# Patient Record
Sex: MALE | Race: BLACK OR AFRICAN AMERICAN | Employment: OTHER | URBAN - NONMETROPOLITAN AREA
[De-identification: names, ages, dates, MRNs, and addresses within clinical notes are randomized per-mention and may not be internally consistent; named-entity substitution may affect disease eponyms.]

---

## 2023-06-13 ENCOUNTER — HOSPITAL ENCOUNTER (INPATIENT)
Age: 73
LOS: 4 days | Discharge: PSYCHIATRIC OR SUBSTANCE USE FACILITY | DRG: 812 | End: 2023-06-17
Attending: EMERGENCY MEDICINE | Admitting: HOSPITALIST
Payer: MEDICARE

## 2023-06-13 DIAGNOSIS — E61.1 IRON DEFICIENCY: ICD-10-CM

## 2023-06-13 DIAGNOSIS — D64.9 ANEMIA REQUIRING TRANSFUSIONS: Primary | ICD-10-CM

## 2023-06-13 LAB
ABO: NORMAL
ALBUMIN SERPL BCG-MCNC: 4 G/DL (ref 3.5–5.1)
ALP SERPL-CCNC: 63 U/L (ref 38–126)
ALT SERPL W/O P-5'-P-CCNC: 14 U/L (ref 11–66)
ANION GAP SERPL CALC-SCNC: 13 MEQ/L (ref 8–16)
ANTIBODY SCREEN: NORMAL
APTT PPP: 28.3 SECONDS (ref 22–38)
AST SERPL-CCNC: 16 U/L (ref 5–40)
BACTERIA URNS QL MICRO: ABNORMAL /HPF
BILIRUB CONJ SERPL-MCNC: < 0.2 MG/DL (ref 0–0.3)
BILIRUB SERPL-MCNC: 0.3 MG/DL (ref 0.3–1.2)
BILIRUB UR QL STRIP.AUTO: NEGATIVE
BUN SERPL-MCNC: 22 MG/DL (ref 7–22)
CALCIUM SERPL-MCNC: 8.2 MG/DL (ref 8.5–10.5)
CASTS #/AREA URNS LPF: ABNORMAL /LPF
CASTS 2: ABNORMAL /LPF
CHARACTER UR: CLEAR
CHLORIDE SERPL-SCNC: 106 MEQ/L (ref 98–111)
CO2 SERPL-SCNC: 20 MEQ/L (ref 23–33)
COLOR: YELLOW
CREAT SERPL-MCNC: 1.5 MG/DL (ref 0.4–1.2)
CRYSTALS URNS MICRO: ABNORMAL
EPITHELIAL CELLS, UA: ABNORMAL /HPF
FERRITIN SERPL IA-MCNC: 5 NG/ML (ref 22–322)
GFR SERPL CREATININE-BSD FRML MDRD: 49 ML/MIN/1.73M2
GLUCOSE SERPL-MCNC: 100 MG/DL (ref 70–108)
GLUCOSE UR QL STRIP.AUTO: NEGATIVE MG/DL
HEMOCCULT STL QL: NEGATIVE
HGB UR QL STRIP.AUTO: NEGATIVE
INR PPP: 1.02 (ref 0.85–1.13)
IRON SATN MFR SERPL: 4 % (ref 20–50)
IRON SERPL-MCNC: 15 UG/DL (ref 65–195)
KETONES UR QL STRIP.AUTO: NEGATIVE
LDH SERPL L TO P-CCNC: 197 U/L (ref 100–190)
MISCELLANEOUS 2: ABNORMAL
NITRITE UR QL STRIP: NEGATIVE
OSMOLALITY SERPL CALC.SUM OF ELEC: 281 MOSMOL/KG (ref 275–300)
PH UR STRIP.AUTO: 6.5 [PH] (ref 5–9)
POTASSIUM SERPL-SCNC: 4.8 MEQ/L (ref 3.5–5.2)
PROT SERPL-MCNC: 6.2 G/DL (ref 6.1–8)
PROT UR STRIP.AUTO-MCNC: ABNORMAL MG/DL
RBC URINE: ABNORMAL /HPF
RENAL EPI CELLS #/AREA URNS HPF: ABNORMAL /[HPF]
RH FACTOR: NORMAL
SCAN OF BLOOD SMEAR: NORMAL
SODIUM SERPL-SCNC: 139 MEQ/L (ref 135–145)
SP GR UR REFRACT.AUTO: 1.01 (ref 1–1.03)
TIBC SERPL-MCNC: 403 UG/DL (ref 171–450)
UROBILINOGEN, URINE: 0.2 EU/DL (ref 0–1)
WBC #/AREA URNS HPF: ABNORMAL /HPF
WBC #/AREA URNS HPF: ABNORMAL /[HPF]
YEAST LIKE FUNGI URNS QL MICRO: ABNORMAL

## 2023-06-13 PROCEDURE — 30233N1 TRANSFUSION OF NONAUTOLOGOUS RED BLOOD CELLS INTO PERIPHERAL VEIN, PERCUTANEOUS APPROACH: ICD-10-PCS | Performed by: PHYSICIAN ASSISTANT

## 2023-06-13 PROCEDURE — 82607 VITAMIN B-12: CPT

## 2023-06-13 PROCEDURE — 81001 URINALYSIS AUTO W/SCOPE: CPT

## 2023-06-13 PROCEDURE — 99223 1ST HOSP IP/OBS HIGH 75: CPT

## 2023-06-13 PROCEDURE — 82248 BILIRUBIN DIRECT: CPT

## 2023-06-13 PROCEDURE — 93010 ELECTROCARDIOGRAM REPORT: CPT | Performed by: NUCLEAR MEDICINE

## 2023-06-13 PROCEDURE — 82728 ASSAY OF FERRITIN: CPT

## 2023-06-13 PROCEDURE — 83540 ASSAY OF IRON: CPT

## 2023-06-13 PROCEDURE — 85610 PROTHROMBIN TIME: CPT

## 2023-06-13 PROCEDURE — 85025 COMPLETE CBC W/AUTO DIFF WBC: CPT

## 2023-06-13 PROCEDURE — 93005 ELECTROCARDIOGRAM TRACING: CPT | Performed by: EMERGENCY MEDICINE

## 2023-06-13 PROCEDURE — 86923 COMPATIBILITY TEST ELECTRIC: CPT

## 2023-06-13 PROCEDURE — 2580000003 HC RX 258

## 2023-06-13 PROCEDURE — 86900 BLOOD TYPING SEROLOGIC ABO: CPT

## 2023-06-13 PROCEDURE — 83615 LACTATE (LD) (LDH) ENZYME: CPT

## 2023-06-13 PROCEDURE — 86850 RBC ANTIBODY SCREEN: CPT

## 2023-06-13 PROCEDURE — 82746 ASSAY OF FOLIC ACID SERUM: CPT

## 2023-06-13 PROCEDURE — 82272 OCCULT BLD FECES 1-3 TESTS: CPT

## 2023-06-13 PROCEDURE — P9016 RBC LEUKOCYTES REDUCED: HCPCS

## 2023-06-13 PROCEDURE — 86901 BLOOD TYPING SEROLOGIC RH(D): CPT

## 2023-06-13 PROCEDURE — 99285 EMERGENCY DEPT VISIT HI MDM: CPT

## 2023-06-13 PROCEDURE — 36415 COLL VENOUS BLD VENIPUNCTURE: CPT

## 2023-06-13 PROCEDURE — 80053 COMPREHEN METABOLIC PANEL: CPT

## 2023-06-13 PROCEDURE — 1200000003 HC TELEMETRY R&B

## 2023-06-13 PROCEDURE — 85730 THROMBOPLASTIN TIME PARTIAL: CPT

## 2023-06-13 PROCEDURE — 83550 IRON BINDING TEST: CPT

## 2023-06-13 RX ORDER — ACETAMINOPHEN 325 MG/1
650 TABLET ORAL EVERY 6 HOURS PRN
Status: DISCONTINUED | OUTPATIENT
Start: 2023-06-13 | End: 2023-06-17 | Stop reason: HOSPADM

## 2023-06-13 RX ORDER — SODIUM CHLORIDE 9 MG/ML
INJECTION, SOLUTION INTRAVENOUS PRN
Status: DISCONTINUED | OUTPATIENT
Start: 2023-06-13 | End: 2023-06-17 | Stop reason: HOSPADM

## 2023-06-13 RX ORDER — POTASSIUM CHLORIDE 7.45 MG/ML
10 INJECTION INTRAVENOUS PRN
Status: DISCONTINUED | OUTPATIENT
Start: 2023-06-13 | End: 2023-06-17 | Stop reason: HOSPADM

## 2023-06-13 RX ORDER — ONDANSETRON 2 MG/ML
4 INJECTION INTRAMUSCULAR; INTRAVENOUS EVERY 6 HOURS PRN
Status: DISCONTINUED | OUTPATIENT
Start: 2023-06-13 | End: 2023-06-17 | Stop reason: HOSPADM

## 2023-06-13 RX ORDER — POTASSIUM CHLORIDE 20 MEQ/1
40 TABLET, EXTENDED RELEASE ORAL PRN
Status: DISCONTINUED | OUTPATIENT
Start: 2023-06-13 | End: 2023-06-17 | Stop reason: HOSPADM

## 2023-06-13 RX ORDER — SUCRALFATE 1 G/1
1 TABLET ORAL 3 TIMES DAILY
COMMUNITY

## 2023-06-13 RX ORDER — POLYETHYLENE GLYCOL 3350 17 G/17G
17 POWDER, FOR SOLUTION ORAL DAILY PRN
Status: DISCONTINUED | OUTPATIENT
Start: 2023-06-13 | End: 2023-06-17 | Stop reason: HOSPADM

## 2023-06-13 RX ORDER — PANTOPRAZOLE SODIUM 40 MG/1
40 TABLET, DELAYED RELEASE ORAL DAILY
COMMUNITY

## 2023-06-13 RX ORDER — M-VIT,TX,IRON,MINS/CALC/FOLIC 27MG-0.4MG
1 TABLET ORAL DAILY
COMMUNITY

## 2023-06-13 RX ORDER — ACETAMINOPHEN 650 MG/1
650 SUPPOSITORY RECTAL EVERY 6 HOURS PRN
Status: DISCONTINUED | OUTPATIENT
Start: 2023-06-13 | End: 2023-06-17 | Stop reason: HOSPADM

## 2023-06-13 RX ORDER — ONDANSETRON 4 MG/1
4 TABLET, ORALLY DISINTEGRATING ORAL EVERY 8 HOURS PRN
Status: DISCONTINUED | OUTPATIENT
Start: 2023-06-13 | End: 2023-06-17 | Stop reason: HOSPADM

## 2023-06-13 RX ORDER — SODIUM CHLORIDE 0.9 % (FLUSH) 0.9 %
5-40 SYRINGE (ML) INJECTION PRN
Status: DISCONTINUED | OUTPATIENT
Start: 2023-06-13 | End: 2023-06-17 | Stop reason: HOSPADM

## 2023-06-13 RX ORDER — DOCUSATE SODIUM 100 MG/1
100 CAPSULE, LIQUID FILLED ORAL 2 TIMES DAILY
COMMUNITY

## 2023-06-13 RX ORDER — MAGNESIUM SULFATE IN WATER 40 MG/ML
2000 INJECTION, SOLUTION INTRAVENOUS PRN
Status: DISCONTINUED | OUTPATIENT
Start: 2023-06-13 | End: 2023-06-17 | Stop reason: HOSPADM

## 2023-06-13 RX ORDER — LANOLIN ALCOHOL/MO/W.PET/CERES
5000 CREAM (GRAM) TOPICAL DAILY
COMMUNITY

## 2023-06-13 RX ORDER — SODIUM CHLORIDE 0.9 % (FLUSH) 0.9 %
5-40 SYRINGE (ML) INJECTION EVERY 12 HOURS SCHEDULED
Status: DISCONTINUED | OUTPATIENT
Start: 2023-06-13 | End: 2023-06-17 | Stop reason: HOSPADM

## 2023-06-13 RX ADMIN — SODIUM CHLORIDE, PRESERVATIVE FREE 10 ML: 5 INJECTION INTRAVENOUS at 21:33

## 2023-06-13 ASSESSMENT — PAIN - FUNCTIONAL ASSESSMENT
PAIN_FUNCTIONAL_ASSESSMENT: NONE - DENIES PAIN
PAIN_FUNCTIONAL_ASSESSMENT: 0-10

## 2023-06-13 ASSESSMENT — PAIN SCALES - GENERAL: PAINLEVEL_OUTOF10: 0

## 2023-06-13 NOTE — ED PROVIDER NOTES
[facility] [address] [address] EMERGENCY DEPT    EMERGENCY MEDICINE     Pt Name: Claire Mayers  MRN: [id number]  [place] 1950  Date of evaluation: 6/13/2023  Resident Physician: Lindsay Moore MD  Supervising Physician: Marianela Shin MD    CHIEF COMPLAINT       Chief Complaint   Patient presents with    Other     Pt states his doctor told him to come in but he doesnt know why      HISTORY OF PRESENT ILLNESS   Claire Mayers is a pleasant 67 y.o. male who presents to the emergency department from [address] [place] [address] for an abnormal lab. Patient is unsure why he is here. States that he was taking his wife to doctor's appointment and now he has ended up being the patient. Per review of the medical records, there is a hemoglobin level of 4.9. Patient does state that they told him this. He does state that over the past 2 months, he has noticed increased fatigue with exertion. He used to be able to walk \"blocks box and blocks\" but now he is only able to walk half a block before becoming short of breath. Denies lightheadedness, dizziness, or syncope. He denies fevers, abdominal pain, blood in his stool, blood in his urine, blood thinners. PASTMEDICAL HISTORY   No past medical history on file. There is no problem list on file for this patient. SURGICAL HISTORY     No past surgical history on file. CURRENT MEDICATIONS       Previous Medications    No medications on file       ALLERGIES     has No Known Allergies. FAMILY HISTORY     has no family status information on file. SOCIAL HISTORY          PHYSICAL EXAM       ED Triage Vitals [06/13/23 1543]   BP Temp Temp Source Pulse Respirations SpO2 Height Weight - Scale   125/86 98.2 °F (36.8 °C) Oral 67 18 100 % -- 130 lb (59 kg)       Physical Exam  Vitals and nursing note reviewed. Exam conducted with a chaperone present. Constitutional:       General: He is not in acute distress. Appearance: He is normal weight.    HENT:      Head:

## 2023-06-13 NOTE — ED NOTES
Pt alert and oriented x3. Pt resting on cot. Pt stated Shannan Hart is going to sleep good tonight. \"     Daxa Boone, CARLOS A  06/13/23 5142

## 2023-06-13 NOTE — CONSENT
Informed Consent for Blood Component Transfusion Note    I have discussed with the patient the rationale for blood component transfusion; its benefits in treating or preventing fatigue, organ damage, or death; and its risk which includes mild transfusion reactions, rare risk of blood borne infection, or more serious but rare reactions. I have discussed the alternatives to transfusion, including the risk and consequences of not receiving transfusion. The patient had an opportunity to ask questions and had agreed to proceed with transfusion of blood components.     Electronically signed by Soco Velásquez MD on 6/13/23 at 6:19 PM EDT

## 2023-06-13 NOTE — ED NOTES
Pt tolerating blood well. Pt resting on cot with no concerns at this time. Pt 15 min observation completed.      Sravani Rivera RN  06/13/23 4501

## 2023-06-13 NOTE — ED PROVIDER NOTES
[facility]  EMERGENCY MEDICINE ATTENDING ATTESTATION      Evaluation of Avni Lopez. Case discussed and care plan developed with resident physician. I agree with the nurse practitioner documentation and plan as documented by him, except if my documentation differs. Patient seen, interviewed and examined by me. I reviewed the medical, surgical, family and social history, medications and allergies. I have reviewed the nursing documentation. Brief H&P   Patient transferred from nursing home for hemoglobin of 4. Patient is currently fairly asymptomatic per his report, only complaining of feeling tired for some time. Patient is slightly disoriented but he is oriented in person, situation and place. Denies melena, hematemesis, hematuria, leg edema. Physical exam is notable for well appearing, slightly pale, otherwise nonfocal exam.      Medical Decision Making   MDM:   New symptomatic anemia with unclear cause  Plan:   IV line, labs  PRBC transfusion  Observation in the ED while awaiting results    Please see the resident physician completed note for final disposition except as documented on this attestation. I have reviewed and interpreted all available lab, radiology and ekg results available at the moment. Diagnosis, treatment and disposition plans were discussed and agreed upon by patient. This transcription was electronically signed. It was dictated by use of voice recognition software and electronically transcribed. The transcription may contain errors not detected in proofreading.      I performed direct supervision and was present for the critical portion following procedures: None  Critical care time on this case: None    Electronically signed by Melinda Cerna MD on 6/13/23 at 5:08 PM EDT        Melinda Cerna MD  06/13/23 0691

## 2023-06-13 NOTE — ED NOTES
ED to inpatient nurses report    Chief Complaint   Patient presents with    Other     Pt states his doctor told him to come in but he doesnt know why       Present to ED from detox  LOC: alert and orientated to name, place, date  Vital signs   Vitals:    06/13/23 1655 06/13/23 1746 06/13/23 1802 06/13/23 1807   BP: 126/88 (!) 160/95 132/70 131/71   Pulse: 76 82 72 75   Resp: 17 15 16 15   Temp:  98.7 °F (37.1 °C) 98.6 °F (37 °C) 98.7 °F (37.1 °C)   TempSrc:  Oral Oral Oral   SpO2: 99% 100% 100% 100%   Weight:          Oxygen Baseline room air    Current needs required room air Bipap/Cpap No  LDAs:   Peripheral IV 68/37/99 Right Cephalic (Active)   Site Assessment Clean, dry & intact 06/13/23 1555   Phlebitis Assessment No symptoms 06/13/23 1555   Infiltration Assessment 0 06/13/23 1555     Mobility: Requires assistance * 1  Pending ED orders: none  Present condition: stable    C-SSRS Risk of Suicide: No Risk  Swallow Screening    Preferred Language: [place]     Electronically signed by Anjana Mehta RN on 6/13/2023 at 6:09 PM       Anjana Mehta RN  06/13/23 1720

## 2023-06-13 NOTE — ED NOTES
Pt resting quietly on cot in stable condition. Denies any needs at this time. Call light in reach.       Girish De Leon RN  06/13/23 0133

## 2023-06-13 NOTE — ED NOTES
Pt to the ED via personal  transport. Pt states he is unsure why he has been sent to this facility. Pt states he was at a doctors office about 30 minutes away and they sent him here. Pt also states he was a pt in another hospital and that hospital transferred him here and he will need transferred back. Pt also states he had taken his wife to the doctor and in turn was sent here. Pt appears to be a poor historian. Pt arrived with envelope with documents from [address] [place] [address] with written document stating his HGB is 4. EKG completed and IV access obtained. Telemetry applied. Patient is alert with Respirations are regular and unlabored. Patient provided blanket. Call light within reach.       Rebel Arriaza, RN  06/13/23 7431

## 2023-06-13 NOTE — H&P
Hospitalist History & Physical    Patient:  Girma Wan    Unit/Bed:10/010A  YOB: 1950  MRN: [id number]   Acct: [de-identified]   PCP: PROVIDER UNKNOWN, LILIANA  Code Status: No Order    Date of Service: Pt seen/examined on 06/13/23 and admitted to Inpatient with expected LOS greater than two midnights due to medical therapy. Chief Complaint: anemia    Assessment/Plan:    Anemia, chronicity unknown: Hgb 4.6 on admission. Found incidentally on labs per ER. Found to be at least partially due to [address]. Fecal occult negative. Relatively asymptomatic. 1 unit pRBCs to be transfused per ER, another unit ordered. Given #5, vitamin b12 and folate pending. H&H q8 Hrs. Iron Deficiency Anemia: low ferritin, iron, and iron saturation. Consider Venofer vs. Iron supplementation s/p transfusion. MITZY vs. CKD: Cr 1.5. Unknown baseline. Urinalysis pending. Avoid nephrotoxic agents as possible. Monitor with daily labs. Baseline Mentation: unknown- Alert & oriented to self and time, not oriented to situation or place. From [facility]. Gastric CA: s/p gastric resection. Noted per chart review. History of Present Illness:  Girma Wna is a 67 y.o. male with unknown PMHx who presented to [facility] with chief complaint of anemia. Patient is alone from [facility]. He is a very poor historian and not able to tell me why he is here or where was he is at. Patient alert to self and time. States his wife brought him here because he keeps smoking marijuana. He said he was supposed to stop but didn't. Patient denies any pain or concerns. Denies chest pain, shortness of breath, lightheadedness, dizziness, abdominal pain, change in stool, dark tarry stools, BRBPR, and nausea/vomiting. Review of Systems: Pertinent positives as noted in the HPI. All other systems reviewed and negative. Past Medical History:    No past medical history on file.     Past Surgical History:

## 2023-06-14 LAB
ANION GAP SERPL CALC-SCNC: 12 MEQ/L (ref 8–16)
ANISOCYTOSIS BLD QL SMEAR: PRESENT
ANISOCYTOSIS BLD QL SMEAR: PRESENT
AUTO DIFF PNL BLD: ABNORMAL
BASOPHILS ABSOLUTE: 0 THOU/MM3 (ref 0–0.1)
BASOPHILS ABSOLUTE: 0 THOU/MM3 (ref 0–0.1)
BASOPHILS NFR BLD AUTO: 0.8 %
BASOPHILS NFR BLD AUTO: 0.9 %
BUN SERPL-MCNC: 20 MG/DL (ref 7–22)
CALCIUM SERPL-MCNC: 7.7 MG/DL (ref 8.5–10.5)
CHLORIDE SERPL-SCNC: 109 MEQ/L (ref 98–111)
CO2 SERPL-SCNC: 19 MEQ/L (ref 23–33)
CREAT SERPL-MCNC: 1.3 MG/DL (ref 0.4–1.2)
DEPRECATED RDW RBC AUTO: 50.2 FL (ref 35–45)
DEPRECATED RDW RBC AUTO: 54.4 FL (ref 35–45)
EOSINOPHIL NFR BLD AUTO: 1.6 %
EOSINOPHIL NFR BLD AUTO: 2.6 %
EOSINOPHILS ABSOLUTE: 0.1 THOU/MM3 (ref 0–0.4)
EOSINOPHILS ABSOLUTE: 0.1 THOU/MM3 (ref 0–0.4)
ERYTHROCYTE [DISTWIDTH] IN BLOOD BY AUTOMATED COUNT: 21.7 % (ref 11.5–14.5)
ERYTHROCYTE [DISTWIDTH] IN BLOOD BY AUTOMATED COUNT: 22.5 % (ref 11.5–14.5)
FOLATE SERPL-MCNC: 12.9 NG/ML (ref 4.8–24.2)
GFR SERPL CREATININE-BSD FRML MDRD: 58 ML/MIN/1.73M2
GLUCOSE SERPL-MCNC: 79 MG/DL (ref 70–108)
HCT VFR BLD AUTO: 19 % (ref 42–52)
HCT VFR BLD AUTO: 20.9 % (ref 42–52)
HCT VFR BLD AUTO: 26.5 % (ref 42–52)
HGB BLD-MCNC: 4.6 GM/DL (ref 14–18)
HGB BLD-MCNC: 5.4 GM/DL (ref 14–18)
HGB BLD-MCNC: 6.9 GM/DL (ref 14–18)
HGB BLD-MCNC: 7.5 GM/DL (ref 14–18)
HGB RETIC QN AUTO: 15.7 PG (ref 28.2–35.7)
HYPOCHROMIA BLD QL SMEAR: PRESENT
HYPOCHROMIA BLD QL SMEAR: PRESENT
IMM GRANULOCYTES # BLD AUTO: 0 THOU/MM3 (ref 0–0.07)
IMM GRANULOCYTES # BLD AUTO: 0.01 THOU/MM3 (ref 0–0.07)
IMM GRANULOCYTES NFR BLD AUTO: 0 %
IMM GRANULOCYTES NFR BLD AUTO: 0.2 %
IMM RETICS NFR: 26.2 % (ref 2.3–13.4)
LYMPHOCYTES ABSOLUTE: 1.1 THOU/MM3 (ref 1–4.8)
LYMPHOCYTES ABSOLUTE: 1.3 THOU/MM3 (ref 1–4.8)
LYMPHOCYTES NFR BLD AUTO: 27.8 %
LYMPHOCYTES NFR BLD AUTO: 28.5 %
MCH RBC QN AUTO: 16 PG (ref 26–33)
MCH RBC QN AUTO: 17.7 PG (ref 26–33)
MCHC RBC AUTO-ENTMCNC: 24.2 GM/DL (ref 32.2–35.5)
MCHC RBC AUTO-ENTMCNC: 25.8 GM/DL (ref 32.2–35.5)
MCV RBC AUTO: 66.2 FL (ref 80–94)
MCV RBC AUTO: 68.5 FL (ref 80–94)
MICROCYTES BLD QL SMEAR: PRESENT
MICROCYTES BLD QL SMEAR: PRESENT
MONOCYTES ABSOLUTE: 0.4 THOU/MM3 (ref 0.4–1.3)
MONOCYTES ABSOLUTE: 0.5 THOU/MM3 (ref 0.4–1.3)
MONOCYTES NFR BLD AUTO: 11.4 %
MONOCYTES NFR BLD AUTO: 12.2 %
NEUTROPHILS NFR BLD AUTO: 56.6 %
NEUTROPHILS NFR BLD AUTO: 57.4 %
NRBC BLD AUTO-RTO: 0 /100 WBC
NRBC BLD AUTO-RTO: 0 /100 WBC
PATHOLOGIST REVIEW: ABNORMAL
PLATELET # BLD AUTO: 179 THOU/MM3 (ref 130–400)
PLATELET # BLD AUTO: 232 THOU/MM3 (ref 130–400)
PMV BLD AUTO: ABNORMAL FL (ref 9.4–12.4)
PMV BLD AUTO: ABNORMAL FL (ref 9.4–12.4)
POTASSIUM SERPL-SCNC: 4.7 MEQ/L (ref 3.5–5.2)
RBC # BLD AUTO: 2.87 MILL/MM3 (ref 4.7–6.1)
RBC # BLD AUTO: 3.05 MILL/MM3 (ref 4.7–6.1)
RETICS # AUTO: 14 THOU/MM3 (ref 20–115)
RETICS/RBC NFR AUTO: 0.4 % (ref 0.5–2)
REVIEWED BY: NORMAL
SEGMENTED NEUTROPHILS ABSOLUTE COUNT: 2.2 THOU/MM3 (ref 1.8–7.7)
SEGMENTED NEUTROPHILS ABSOLUTE COUNT: 2.5 THOU/MM3 (ref 1.8–7.7)
SMEAR REVIEW: NORMAL
SODIUM SERPL-SCNC: 140 MEQ/L (ref 135–145)
VIT B12 SERPL-MCNC: > 2000 PG/ML (ref 211–911)
WBC # BLD AUTO: 3.8 THOU/MM3 (ref 4.8–10.8)
WBC # BLD AUTO: 4.4 THOU/MM3 (ref 4.8–10.8)

## 2023-06-14 PROCEDURE — 80048 BASIC METABOLIC PNL TOTAL CA: CPT

## 2023-06-14 PROCEDURE — 83010 ASSAY OF HAPTOGLOBIN QUANT: CPT

## 2023-06-14 PROCEDURE — 97110 THERAPEUTIC EXERCISES: CPT

## 2023-06-14 PROCEDURE — 36415 COLL VENOUS BLD VENIPUNCTURE: CPT

## 2023-06-14 PROCEDURE — 97165 OT EVAL LOW COMPLEX 30 MIN: CPT

## 2023-06-14 PROCEDURE — 85046 RETICYTE/HGB CONCENTRATE: CPT

## 2023-06-14 PROCEDURE — C9113 INJ PANTOPRAZOLE SODIUM, VIA: HCPCS | Performed by: NURSE PRACTITIONER

## 2023-06-14 PROCEDURE — 2580000003 HC RX 258: Performed by: NURSE PRACTITIONER

## 2023-06-14 PROCEDURE — 1200000003 HC TELEMETRY R&B

## 2023-06-14 PROCEDURE — 36430 TRANSFUSION BLD/BLD COMPNT: CPT

## 2023-06-14 PROCEDURE — 2580000003 HC RX 258

## 2023-06-14 PROCEDURE — 6360000002 HC RX W HCPCS: Performed by: NURSE PRACTITIONER

## 2023-06-14 PROCEDURE — 85025 COMPLETE CBC W/AUTO DIFF WBC: CPT

## 2023-06-14 PROCEDURE — 85014 HEMATOCRIT: CPT

## 2023-06-14 PROCEDURE — 99233 SBSQ HOSP IP/OBS HIGH 50: CPT

## 2023-06-14 PROCEDURE — 85018 HEMOGLOBIN: CPT

## 2023-06-14 RX ORDER — PANTOPRAZOLE SODIUM 40 MG/1
40 TABLET, DELAYED RELEASE ORAL DAILY
Status: DISCONTINUED | OUTPATIENT
Start: 2023-06-14 | End: 2023-06-14

## 2023-06-14 RX ORDER — SODIUM CHLORIDE 9 MG/ML
INJECTION, SOLUTION INTRAVENOUS PRN
Status: DISCONTINUED | OUTPATIENT
Start: 2023-06-14 | End: 2023-06-17 | Stop reason: HOSPADM

## 2023-06-14 RX ORDER — SODIUM CHLORIDE, SODIUM LACTATE, POTASSIUM CHLORIDE, CALCIUM CHLORIDE 600; 310; 30; 20 MG/100ML; MG/100ML; MG/100ML; MG/100ML
INJECTION, SOLUTION INTRAVENOUS CONTINUOUS
Status: DISCONTINUED | OUTPATIENT
Start: 2023-06-14 | End: 2023-06-16

## 2023-06-14 RX ORDER — SUCRALFATE 1 G/1
1 TABLET ORAL 3 TIMES DAILY
Status: DISCONTINUED | OUTPATIENT
Start: 2023-06-14 | End: 2023-06-17 | Stop reason: HOSPADM

## 2023-06-14 RX ORDER — SODIUM CHLORIDE 9 MG/ML
INJECTION, SOLUTION INTRAVENOUS PRN
Status: DISCONTINUED | OUTPATIENT
Start: 2023-06-14 | End: 2023-06-14

## 2023-06-14 RX ADMIN — IRON SUCROSE 200 MG: 20 INJECTION, SOLUTION INTRAVENOUS at 12:54

## 2023-06-14 RX ADMIN — PANTOPRAZOLE SODIUM 8 MG/HR: 40 INJECTION, POWDER, FOR SOLUTION INTRAVENOUS at 22:48

## 2023-06-14 RX ADMIN — SODIUM CHLORIDE, POTASSIUM CHLORIDE, SODIUM LACTATE AND CALCIUM CHLORIDE: 600; 310; 30; 20 INJECTION, SOLUTION INTRAVENOUS at 19:46

## 2023-06-14 RX ADMIN — PANTOPRAZOLE SODIUM 8 MG/HR: 40 INJECTION, POWDER, FOR SOLUTION INTRAVENOUS at 12:42

## 2023-06-14 NOTE — PROCEDURES
[facility] Endoscopy     EGD Report    Patient: Litzy Rivera  : 1950  Acct#: [de-identified]     BRIEF HISTORY AND INDICATIONS:    The patient is a 67 y.o.,  male with significant past medical history of profound anemia, history of gastric cancer s/p surgical resection. He is a xfer from [facility]. PREMEDICATION:  TIVA anesthesia was used, see Anesthesia note for details. INSTRUMENT:  Olympus GIF H-180 gastroscope    The risks and benefits of upper endoscopy with biopsy and dilation were  described to the patient, including but not limited to bleeding, infection, poking a hole someplace requiring surgery to fix it, having reaction to medication, and death. The patient understood these risks and provided informed consent. The patient was placed in the left lateral decubitus position. Conscious sedation was administered . The patient was continuously monitored to ensure adequate sedation and patient safety. A forward-viewing Olympus endoscope was lubricated and inserted through the mouth into the oropharynx. Under direct visualization, the upper esophagus was intubated. The scope was advanced to the esophagus and stomach to second portion of duodenum. Scope was slowly withdrawn with good views of mucosal surfaces. The scope was retroflexed in the fundus. Findings and maneuvers are listed in impression below. The patient tolerated the procedure well. The scope was removed. The patient was removed to the recovery area. IMPRESSION:      1. Thickened gastric folds concerning for recurring growth in the stomach, there may be submucosal invasion. Contact bleeding , friability . 2.   Biopsies taken of the abnormal appearing gastric mucosa . 3.   Prior surgery of the stomach. 4.   Erosive esophagitis. 5.   Otherwise, normal endoscopy to the 2nd portion of the duodenum. RECOMMENDATIONS:    1. Heme/Onco already seeing patient.

## 2023-06-14 NOTE — CARE COORDINATION
6/14/23, 7:50 AM EDT      DISCHARGE PLANNING EVALUATION    Stevan Arce  Admitted: 6/13/2023  Hospital Day: 1    Location: [facility]22/022-A Reason for admit: Iron deficiency [E61.1]  Anemia requiring transfusions [D64.9]  Acute on chronic anemia [D64.9]    Past Medical History:   Diagnosis Date    Hypertension        Procedure: N/A  Barriers to Discharge: Patient presents from [facility] for an abnormal lab. H/H on arrival 4.6/19.0. Patient was transfused with one unit PRBC's. H/H this a.m. 5.4/20.9, Creatinine 1.3. Prn Tylenol and Zofran, Magnesium and Potassium replacement protocol, transfuse second unit of PRBC's, Repeat H/H, telemetry, PT/OT, SS, up with assistance. PCP: PROVIDER UNKNOWN, AGPCNP    Readmission Risk Low 0-14, Mod 15-19), High > 20: Readmission Risk Score: 13.4      Advance Directives:      Code Status: Full Code   Patient's Primary Decision Maker is:        Patient Goals/Plan/Treatment Preferences: Benjamin Guerra is from [facility]. Transportation/Food Security/Housekeeping Addressed: No issues identified.

## 2023-06-14 NOTE — CARE COORDINATION
DISCHARGE PLANNING EVALUATION  6/14/23, 1:35 PM EDT    Reason for Referral: patient is from [facility]  Mental Status: alert and answers questions appropriately  Decision Making: self  Family/Social/Home Environment: Nick Singer lives at home with his wife, he reports he has 4 children that lives in the area. Pt reports being independent in his care prior  Current Services including food security, transportation and housekeeping: was at [facility] prior, does not plant to return  Current Equipment:none  Payment Source:Medicare  Concerns or Barriers to Discharge: none      Teach Back Method used with Nick Singer regarding care plan and needs  Patient and wife verbalized understanding of the plan of care and contribute to goal setting. Patient goals, treatment preferences and discharge plan: Return home with wife. Pt okay with contacting wife to confirm plans. Call to pt's wife, she confirms plans for pt to return back home, reports [facility] to be transporting pt back home. Call to [facility], spoke with Montrose Colette ext [phone], reports they can transport back home at discharge. She is asked if possible to give a days notice, reports drivers are already back home as the days got one, but if they know pt's possible discharge the day before they can be prepared for this. Electronically signed by BRITTON Bowser on 6/14/2023 at 1:35 PM       Readmission Risk Low 0-14, Mod 15-19), High > 20: Readmission Risk Score: 13    Current PCP: PROVIDER UNKNOWN, AGPCNP  PCP verified by CM?  No    Patient Orientation: Other (see comment) (alert and answers questions appropriately)    Patient Cognition: Alert  History Provided by: Significant Other, Other (see comment)    Advance Directives:      Code Status: Full Code   Patient's Primary Decision Maker is: Legal Next of Kin       Discharge Planning:    Patient lives with: Alone Type of Home: Behavioral Health  Primary Care Giver:

## 2023-06-14 NOTE — CARE COORDINATION
6/14/23, 9:03 AM EDT    DISCHARGE PLANNING EVALUATION    Spoke with CM, who had discussed with ENT provider, pt have HME, no trach or inner cannula. Call to Marlena Hutson 226 with [facility], left a voicemail for a call back.

## 2023-06-14 NOTE — H&P
[facility]  Sedation/Analgesia History & Physical    Patient: Jose Miguel Joyce : 1950  Med Rec#: [id number] Acc#: [id number]   Provider Performing Procedure: Gagan Arita MD  Primary Care Physician: PROVIDER LILIANA Dhillon    PRE-PROCEDURE   Brief History/Pre-Procedure Diagnosis:The patient is a 67 y.o.,  male with significant past medical history of profound anemia, history of gastric cancer s/p surgical resection. MEDICAL HISTORY  []CAD/Valve  []Liver Disease  []Lung Disease []Diabetes  []Hypertension []Renal Disease  [x]Additional information:       has a past medical history of Hypertension. SURGICAL HISTORY   has no past surgical history on file.   Additional information:       ALLERGIES   Allergies as of 2023    (No Known Allergies)     Additional information:       MEDICATIONS       Current Facility-Administered Medications:     0.9 % sodium chloride infusion, , IntraVENous, PRN, PAUL Whitman    sertraline (ZOLOFT) tablet 50 mg, 50 mg, Oral, Daily, CHASE Rios CNP, 50 mg at 06/15/23 1899    [Held by provider] sucralfate (CARAFATE) tablet 1 g, 1 g, Oral, TID, CHASE Rios - CNP    pantoprazole (PROTONIX) 80 mg in sodium chloride 0.9 % 100 mL infusion, 8 mg/hr, IntraVENous, Continuous, CHASE Brunson CNP, Last Rate: 10 mL/hr at 06/15/23 1102, 8 mg/hr at 06/15/23 1102    iron sucrose (VENOFER) 200 mg in sodium chloride 0.9 % 100 mL IVPB, 200 mg, IntraVENous, Q24H, CHASE Brunson CNP, Stopped at 06/15/23 0840    lactated ringers IV soln infusion, , IntraVENous, Continuous, CHASE Rios CNP, Last Rate: 50 mL/hr at 23 194, New Bag at 23 194    0.9 % sodium chloride infusion, , IntraVENous, PRN, Virgilio Castrejon PA-C    sodium chloride flush 0.9 % injection 5-40 mL, 5-40 mL, IntraVENous, 2 times per day, Virgilio Castrejon PA-C, 10 mL at 23 2133    sodium chloride flush 0.9 % injection 5-40 mL, 5-40 mL,

## 2023-06-14 NOTE — CONSULTS
Consult History & Physical      Patient:  Candie Marcum  YOB: 1950  MRN: [id number]     Acct: [de-identified]    Chief Complaint:  Anemia    Date of Service: Pt seen/examined in consultation on 6/14/2023    History Of Present Illness:      67 y.o. male who we are asked to see/evaluate by CHASE Garcia CNP for medical management of anemia. HPI from patient and chart review. Patient was sent to the ED yesterday for a low Hgb on OP labs. Patient is currently residing at [facility]. Initial Hgb 4.6, received 1 unit PRBC, recheck Hgb 5.4, he received a 2nd unit PRBC, repeat Hgb pending. Patient denies prior history of anemia or blood transfusion. Noted to be iron deficient on labs. Patient denies abdominal pain, nausea, vomiting, diarrhea, constipation, melena, and hematochezia. Denies anticoagulation and NSAID use. Patient had an EGD and colonoscopy years ago, in [facility]. Patient states he has a history of cancer for which he underwent abdominal surgery and \"they removed a little bit. \" Patient is unable to say what kind of cancer he had. Patient's last colonoscopy was a few years ago, in [facility], and was negative according to him. Patient denies family history of any GI cancers. Past Medical History:    Past Medical History:   Diagnosis Date    Hypertension        Home Medications:  Prior to Admission medications    Medication Sig Start Date End Date Taking?  Authorizing Provider   docusate sodium (COLACE) 100 MG capsule Take 1 capsule by mouth 2 times daily   Yes Historical Provider, MD   Multiple Vitamins-Minerals (THERAPEUTIC MULTIVITAMIN-MINERALS) tablet Take 1 tablet by mouth daily   Yes Historical Provider, MD   vitamin B-12 (CYANOCOBALAMIN) 1000 MCG tablet Take 5 tablets by mouth daily   Yes Historical Provider, MD   sertraline (ZOLOFT) 50 MG tablet Take 1 tablet by mouth daily   Yes Historical Provider, MD   pantoprazole (PROTONIX) 40 MG tablet Take 1 tablet by mouth

## 2023-06-15 LAB
ANION GAP SERPL CALC-SCNC: 12 MEQ/L (ref 8–16)
ANISOCYTOSIS BLD QL SMEAR: PRESENT
BASOPHILS ABSOLUTE: 0.1 THOU/MM3 (ref 0–0.1)
BASOPHILS NFR BLD AUTO: 1.4 %
BUN SERPL-MCNC: 13 MG/DL (ref 7–22)
CA-I BLD ISE-SCNC: 1.11 MMOL/L (ref 1.12–1.32)
CALCIUM SERPL-MCNC: 7.8 MG/DL (ref 8.5–10.5)
CHLORIDE SERPL-SCNC: 107 MEQ/L (ref 98–111)
CO2 SERPL-SCNC: 20 MEQ/L (ref 23–33)
CREAT SERPL-MCNC: 1.2 MG/DL (ref 0.4–1.2)
DEPRECATED RDW RBC AUTO: 61.8 FL (ref 35–45)
EKG ATRIAL RATE: 75 BPM
EKG P AXIS: 83 DEGREES
EKG P-R INTERVAL: 128 MS
EKG Q-T INTERVAL: 384 MS
EKG QRS DURATION: 80 MS
EKG QTC CALCULATION (BAZETT): 428 MS
EKG R AXIS: 25 DEGREES
EKG T AXIS: 65 DEGREES
EKG VENTRICULAR RATE: 75 BPM
EOSINOPHIL NFR BLD AUTO: 3.1 %
EOSINOPHILS ABSOLUTE: 0.2 THOU/MM3 (ref 0–0.4)
ERYTHROCYTE [DISTWIDTH] IN BLOOD BY AUTOMATED COUNT: 24.5 % (ref 11.5–14.5)
GFR SERPL CREATININE-BSD FRML MDRD: > 60 ML/MIN/1.73M2
GLUCOSE SERPL-MCNC: 84 MG/DL (ref 70–108)
HCT VFR BLD AUTO: 25.7 % (ref 42–52)
HCT VFR BLD AUTO: 27.1 % (ref 42–52)
HCT VFR BLD AUTO: 29.7 % (ref 42–52)
HGB BLD-MCNC: 7.1 GM/DL (ref 14–18)
HGB BLD-MCNC: 7.6 GM/DL (ref 14–18)
HGB BLD-MCNC: 8.3 GM/DL (ref 14–18)
HYPOCHROMIA BLD QL SMEAR: PRESENT
IMM GRANULOCYTES # BLD AUTO: 0.02 THOU/MM3 (ref 0–0.07)
IMM GRANULOCYTES NFR BLD AUTO: 0.4 %
LYMPHOCYTES ABSOLUTE: 0.9 THOU/MM3 (ref 1–4.8)
LYMPHOCYTES NFR BLD AUTO: 18.1 %
MAGNESIUM SERPL-MCNC: 2.1 MG/DL (ref 1.6–2.4)
MCH RBC QN AUTO: 20.3 PG (ref 26–33)
MCHC RBC AUTO-ENTMCNC: 28 GM/DL (ref 32.2–35.5)
MCV RBC AUTO: 72.5 FL (ref 80–94)
MONOCYTES ABSOLUTE: 0.5 THOU/MM3 (ref 0.4–1.3)
MONOCYTES NFR BLD AUTO: 10.7 %
NEUTROPHILS NFR BLD AUTO: 66.3 %
NRBC BLD AUTO-RTO: 0 /100 WBC
PLATELET # BLD AUTO: 166 THOU/MM3 (ref 130–400)
PMV BLD AUTO: ABNORMAL FL (ref 9.4–12.4)
POTASSIUM SERPL-SCNC: 4.5 MEQ/L (ref 3.5–5.2)
RBC # BLD AUTO: 3.74 MILL/MM3 (ref 4.7–6.1)
SEGMENTED NEUTROPHILS ABSOLUTE COUNT: 3.4 THOU/MM3 (ref 1.8–7.7)
SODIUM SERPL-SCNC: 139 MEQ/L (ref 135–145)
TSH SERPL DL<=0.005 MIU/L-ACNC: 0.88 UIU/ML (ref 0.4–4.2)
WBC # BLD AUTO: 5.1 THOU/MM3 (ref 4.8–10.8)

## 2023-06-15 PROCEDURE — 85025 COMPLETE CBC W/AUTO DIFF WBC: CPT

## 2023-06-15 PROCEDURE — 88342 IMHCHEM/IMCYTCHM 1ST ANTB: CPT

## 2023-06-15 PROCEDURE — 3609017100 HC EGD: Performed by: INTERNAL MEDICINE

## 2023-06-15 PROCEDURE — 99222 1ST HOSP IP/OBS MODERATE 55: CPT | Performed by: PHYSICIAN ASSISTANT

## 2023-06-15 PROCEDURE — 3700000000 HC ANESTHESIA ATTENDED CARE: Performed by: INTERNAL MEDICINE

## 2023-06-15 PROCEDURE — 85018 HEMOGLOBIN: CPT

## 2023-06-15 PROCEDURE — 82330 ASSAY OF CALCIUM: CPT

## 2023-06-15 PROCEDURE — 84443 ASSAY THYROID STIM HORMONE: CPT

## 2023-06-15 PROCEDURE — 6360000002 HC RX W HCPCS: Performed by: NURSE PRACTITIONER

## 2023-06-15 PROCEDURE — 2580000003 HC RX 258

## 2023-06-15 PROCEDURE — 3609012400 HC EGD TRANSORAL BIOPSY SINGLE/MULTIPLE: Performed by: INTERNAL MEDICINE

## 2023-06-15 PROCEDURE — 0DB68ZX EXCISION OF STOMACH, VIA NATURAL OR ARTIFICIAL OPENING ENDOSCOPIC, DIAGNOSTIC: ICD-10-PCS | Performed by: INTERNAL MEDICINE

## 2023-06-15 PROCEDURE — 6370000000 HC RX 637 (ALT 250 FOR IP)

## 2023-06-15 PROCEDURE — 36415 COLL VENOUS BLD VENIPUNCTURE: CPT

## 2023-06-15 PROCEDURE — 2580000003 HC RX 258: Performed by: NURSE PRACTITIONER

## 2023-06-15 PROCEDURE — 88305 TISSUE EXAM BY PATHOLOGIST: CPT

## 2023-06-15 PROCEDURE — 3700000001 HC ADD 15 MINUTES (ANESTHESIA): Performed by: INTERNAL MEDICINE

## 2023-06-15 PROCEDURE — 80048 BASIC METABOLIC PNL TOTAL CA: CPT

## 2023-06-15 PROCEDURE — 99232 SBSQ HOSP IP/OBS MODERATE 35: CPT

## 2023-06-15 PROCEDURE — 1200000003 HC TELEMETRY R&B

## 2023-06-15 PROCEDURE — C9113 INJ PANTOPRAZOLE SODIUM, VIA: HCPCS | Performed by: NURSE PRACTITIONER

## 2023-06-15 PROCEDURE — 83735 ASSAY OF MAGNESIUM: CPT

## 2023-06-15 PROCEDURE — 7100000010 HC PHASE II RECOVERY - FIRST 15 MIN: Performed by: INTERNAL MEDICINE

## 2023-06-15 PROCEDURE — 85014 HEMATOCRIT: CPT

## 2023-06-15 RX ADMIN — SERTRALINE 50 MG: 50 TABLET, FILM COATED ORAL at 08:21

## 2023-06-15 RX ADMIN — IRON SUCROSE 200 MG: 20 INJECTION, SOLUTION INTRAVENOUS at 08:25

## 2023-06-15 RX ADMIN — PANTOPRAZOLE SODIUM 8 MG/HR: 40 INJECTION, POWDER, FOR SOLUTION INTRAVENOUS at 22:10

## 2023-06-15 RX ADMIN — SODIUM CHLORIDE, POTASSIUM CHLORIDE, SODIUM LACTATE AND CALCIUM CHLORIDE: 600; 310; 30; 20 INJECTION, SOLUTION INTRAVENOUS at 16:18

## 2023-06-15 RX ADMIN — PANTOPRAZOLE SODIUM 8 MG/HR: 40 INJECTION, POWDER, FOR SOLUTION INTRAVENOUS at 11:02

## 2023-06-15 ASSESSMENT — PAIN SCALES - GENERAL: PAINLEVEL_OUTOF10: 0

## 2023-06-15 ASSESSMENT — PAIN - FUNCTIONAL ASSESSMENT: PAIN_FUNCTIONAL_ASSESSMENT: NONE - DENIES PAIN

## 2023-06-15 NOTE — CONSULTS
[facility]'s    Patient Tiffany Nuno   MRN -  [id number]   [facility]   - 1950      Date of Admission -  2023  3:33 PM  Date of evaluation -  6/15/2023  Room - [facility] Day - 2  Referred by- CHASE Diamond - CNP Primary Care Physician - PROVIDER Jacqueline Carrillo, [address]       Reason for Consult    Profound anemia   Active Hospital Problem List      Active Hospital Problems    Diagnosis Date Noted    Acute on chronic anemia [D64.9] 2023     DB Hunter is a 67 y.o. male admitted for anemia. The patient presented to the ED on 23 for Hgb 4. The patient was at [facility] and had abnormal lab work with hemoglobin 4.9. He was directed to the ED. In the ED he had reported fatigue for 2 months and associated COSTELLO. Recheck of CBC showed hemoglobin 4.6, hematocrit 19.0, MCV 66.2. His white blood cell count 4.4 and platelet count 285,187. He was noted to have severe iron deficiency with ferritin 5, iron 15, iron sat 4%, TIBC 403. He received 1 units of packed red blood cells. He was admitted under the Hospitalist service. GI was consulted and the patient is planned to have EGD today. He was ordered IV Venofer 200 mg x 3. He is on PPI infusion. Hematology/oncology consult was requested to follow-up on above. The patient is currently resting in bed. He is poor historian at times. He reports over the last 6 months he has had unintentional weight loss of approximately 45 to 50 pounds. He reports poor appetite at times. He denies headaches, lightheadedness, dizziness, chest pain, shortness of breath, abdominal pain, nausea, bowel or urinary changes. He denies any abnormal bleeding; no epistaxis, hemoptysis, hematemesis, melena, hematochezia, hematuria. He reports history of stomach cancer \"years ago and small part of stomach removed\". Denies other chronic medical conditions.  He states he is unsure why he was at

## 2023-06-16 LAB
HAPTOGLOB SERPL-MCNC: 86 MG/DL (ref 30–200)
HCT VFR BLD AUTO: 27.1 % (ref 42–52)
HGB BLD-MCNC: 7.6 GM/DL (ref 14–18)

## 2023-06-16 PROCEDURE — 2580000003 HC RX 258: Performed by: NURSE PRACTITIONER

## 2023-06-16 PROCEDURE — 1200000003 HC TELEMETRY R&B

## 2023-06-16 PROCEDURE — 6360000002 HC RX W HCPCS

## 2023-06-16 PROCEDURE — 6360000002 HC RX W HCPCS: Performed by: NURSE PRACTITIONER

## 2023-06-16 PROCEDURE — 6370000000 HC RX 637 (ALT 250 FOR IP)

## 2023-06-16 PROCEDURE — 99232 SBSQ HOSP IP/OBS MODERATE 35: CPT

## 2023-06-16 PROCEDURE — 36415 COLL VENOUS BLD VENIPUNCTURE: CPT

## 2023-06-16 PROCEDURE — 85014 HEMATOCRIT: CPT

## 2023-06-16 PROCEDURE — 2580000003 HC RX 258

## 2023-06-16 PROCEDURE — 99232 SBSQ HOSP IP/OBS MODERATE 35: CPT | Performed by: PHYSICIAN ASSISTANT

## 2023-06-16 PROCEDURE — 85018 HEMOGLOBIN: CPT

## 2023-06-16 RX ORDER — PANTOPRAZOLE SODIUM 40 MG/1
40 TABLET, DELAYED RELEASE ORAL
Status: DISCONTINUED | OUTPATIENT
Start: 2023-06-17 | End: 2023-06-17 | Stop reason: HOSPADM

## 2023-06-16 RX ADMIN — ONDANSETRON 4 MG: 2 INJECTION INTRAMUSCULAR; INTRAVENOUS at 13:14

## 2023-06-16 RX ADMIN — SUCRALFATE 1 G: 1 TABLET ORAL at 13:15

## 2023-06-16 RX ADMIN — SUCRALFATE 1 G: 1 TABLET ORAL at 20:41

## 2023-06-16 RX ADMIN — ACETAMINOPHEN 650 MG: 325 TABLET ORAL at 13:15

## 2023-06-16 RX ADMIN — SERTRALINE 50 MG: 50 TABLET, FILM COATED ORAL at 08:34

## 2023-06-16 RX ADMIN — IRON SUCROSE 200 MG: 20 INJECTION, SOLUTION INTRAVENOUS at 08:37

## 2023-06-16 RX ADMIN — SODIUM CHLORIDE, POTASSIUM CHLORIDE, SODIUM LACTATE AND CALCIUM CHLORIDE: 600; 310; 30; 20 INJECTION, SOLUTION INTRAVENOUS at 14:18

## 2023-06-16 ASSESSMENT — PAIN DESCRIPTION - DESCRIPTORS: DESCRIPTORS: ACHING

## 2023-06-16 ASSESSMENT — PAIN DESCRIPTION - LOCATION: LOCATION: ABDOMEN

## 2023-06-16 ASSESSMENT — PAIN SCALES - GENERAL: PAINLEVEL_OUTOF10: 3

## 2023-06-16 ASSESSMENT — PAIN DESCRIPTION - ORIENTATION: ORIENTATION: MID

## 2023-06-16 NOTE — CARE COORDINATION
6/16/23, 12:43 PM EDT    Anticipating discharge 6/17    Patient goals/plan/ treatment preferences discussed by  and . Patient goals/plan/ treatment preferences reviewed with patient/ family. Patient/ family verbalize understanding of discharge plan and are in agreement with goal/plan/treatment preferences. Understanding was demonstrated using the teach back method. AVS provided by RN at time of discharge, which includes all necessary medical information pertaining to the patients current course of illness, treatment, post-discharge goals of care, and treatment preferences. Services At/After Discharge: None       IMM Letter  IMM Letter given to Patient/Family/Significant other/Guardian/POA/by[de-identified] Pt. Access  IMM Letter date given[de-identified] 06/13/23  IMM Letter time given[de-identified] 8906     Call to [facility], spoke with Shaheen Interiano, updated on plans for discharge tomorrow. SW requesting morning to early afternoon transport. She will let staff know.  Nursing will need to call [place] tomorrow morning to confirm discharge/transport [phone]

## 2023-06-17 VITALS
RESPIRATION RATE: 18 BRPM | OXYGEN SATURATION: 99 % | SYSTOLIC BLOOD PRESSURE: 142 MMHG | TEMPERATURE: 98 F | WEIGHT: 130 LBS | HEIGHT: 71 IN | HEART RATE: 75 BPM | BODY MASS INDEX: 18.2 KG/M2 | DIASTOLIC BLOOD PRESSURE: 74 MMHG

## 2023-06-17 LAB
HCT VFR BLD AUTO: 26.9 % (ref 42–52)
HGB BLD-MCNC: 7.3 GM/DL (ref 14–18)

## 2023-06-17 PROCEDURE — 99239 HOSP IP/OBS DSCHRG MGMT >30: CPT

## 2023-06-17 PROCEDURE — 36415 COLL VENOUS BLD VENIPUNCTURE: CPT

## 2023-06-17 PROCEDURE — 85018 HEMOGLOBIN: CPT

## 2023-06-17 PROCEDURE — 85014 HEMATOCRIT: CPT

## 2023-06-17 PROCEDURE — 6370000000 HC RX 637 (ALT 250 FOR IP)

## 2023-06-17 PROCEDURE — 6370000000 HC RX 637 (ALT 250 FOR IP): Performed by: NURSE PRACTITIONER

## 2023-06-17 RX ORDER — FERROUS SULFATE 325(65) MG
325 TABLET ORAL 2 TIMES DAILY WITH MEALS
Status: DISCONTINUED | OUTPATIENT
Start: 2023-06-17 | End: 2023-06-17 | Stop reason: HOSPADM

## 2023-06-17 RX ORDER — FERROUS SULFATE 325(65) MG
325 TABLET ORAL 2 TIMES DAILY WITH MEALS
Qty: 30 TABLET | Refills: 0 | Status: SHIPPED | OUTPATIENT
Start: 2023-06-17

## 2023-06-17 RX ADMIN — SUCRALFATE 1 G: 1 TABLET ORAL at 10:05

## 2023-06-17 RX ADMIN — PANTOPRAZOLE SODIUM 40 MG: 40 TABLET, DELAYED RELEASE ORAL at 06:22

## 2023-06-17 RX ADMIN — SERTRALINE 50 MG: 50 TABLET, FILM COATED ORAL at 10:06

## 2023-06-17 RX ADMIN — FERROUS SULFATE TAB 325 MG (65 MG ELEMENTAL FE) 325 MG: 325 (65 FE) TAB at 10:05

## 2023-06-17 NOTE — PROGRESS NOTES
1336 patient in recovery, VSS, denies any pain. 1346:Report called to Lacy STRAUSS on 5K.
Discharge paperwork given to . IV was removed. All belongings packed and taken with . No other concerns. Report called to facility and paperwork faxed.
EGD complete, photos taken, 1 specimens taken by forceps , pt tolerated procedure well    Scope Number  gif [id number].
Gastroenterology Progress Note:     Patient Name:  Ramila Blank   MRN: [id number]  [id number]  YOB: 1950  Admit Date: 6/13/2023  3:33 PM  Primary Care Physician: PROVIDER LILIANA Eduardo   5K-22/022-A     Patient seen and examined. 24 hours events and chart reviewed. Subjective: Patient resting in bed. Denies acute complaints. EGD results reviewed. Hgb stable. Objective:  /72   Pulse 69   Temp 98 °F (36.7 °C) (Oral)   Resp 16   Ht 5' 11\" (1.803 m)   Wt 130 lb (59 kg)   SpO2 99%   BMI 18.13 kg/m²     Physical Exam:    General:  Nourished in no distress  HEENT: Atraumatic, normocephalic. Moist oral mucous membranes. Neck: Supple without adenopathy, JVD, thyromegaly or masses. Trachea midline. CV: Heart RRR, no murmurs, rubs, gallops. Resp: Even, easy without cough or accessory use. Lungs clear to ascultation bilaterally. Abd: Round, soft, nontender. No hepatosplenomegaly or mass present. Active bowel sounds heard. No distention noted. Ext:  Without cyanosis, clubbing, edema. Skin: Pink, warm, dry  Neuro:  Alert, oriented x 3 with no obvious deficits.        Rectal: deferred    Labs:   CBC:   Lab Results   Component Value Date/Time    WBC 5.1 06/15/2023 05:00 AM    HGB 7.6 06/16/2023 05:25 AM    HCT 27.1 06/16/2023 05:25 AM    MCV 72.5 06/15/2023 05:00 AM     06/15/2023 05:00 AM     BMP:   Lab Results   Component Value Date/Time     06/15/2023 05:00 AM    K 4.5 06/15/2023 05:00 AM    K 4.7 06/14/2023 03:36 AM     06/15/2023 05:00 AM    CO2 20 06/15/2023 05:00 AM    BUN 13 06/15/2023 05:00 AM    CREATININE 1.2 06/15/2023 05:00 AM    CALCIUM 7.8 06/15/2023 05:00 AM     PT/INR:   Lab Results   Component Value Date/Time    INR 1.02 06/13/2023 03:45 PM     LFTs:   Lab Results   Component Value Date/Time    ALKPHOS 63 06/13/2023 03:45 PM    ALT 14 06/13/2023 03:45 PM    AST 16 06/13/2023 03:45 PM    BILITOT 0.3 06/13/2023 03:45 PM    BILIDIR <0.2 06/13/2023 03:45
Hospitalist Progress Note      Patient:  Dimitry Morataya    Unit/Bed:5K-22/022-A  YOB: 1950  MRN: [id number]   Acct: [de-identified]   PCP: PROVIDER UNKNOWN, LILIANA  Date of Admission: 6/13/2023    Assessment/Plan:    Microcytic anemia, unknown chronicity: Hemoglobin 4.6 on arrival.  Was found on outpatient labs. Denies symptoms at this time. Definite level of iron deficiency anemia. FOBT was negative. Patient does report history of gastric cancer s/p gastric resection, daughter states it was approximately 30 years ago. B12 and folate were normal.  S/p 2 units PRBCs, hemoglobin is 6.9. Third unit to be transfused. GI and hematology consulted. GI started on Protonix drip, IV Venofer, planning for EGD tomorrow. Reticulocytes, peripheral smear, haptoglobin ordered. Monitor H&H every 8 hours. MITZY versus CKD: Creatinine 1.5 on arrival, unknown baseline. UA was negative for infection. Creatinine is down to 1.3 this morning. Will trial IVF. Avoid nephrotoxic agents, renally dose medications. Will monitor with BMP. ? Undiagnosed dementia: Patient's family reports intermittent confusion which is chronic. Patient is only alert and oriented to self and time. Appears to be at baseline. History of gastric cancer: S/p gastric resection. Patient's daughter reports was approximately 30 years ago. Treated in [facility]. Does not follow with an oncologist.  Substance use disorder: Patient from [facility], admitted for substance use disorder. Reported history of meth and crack cocaine use. Chief Complaint: anemia    Initial H and P:-    Dimitry Morataya is a 67 y.o. male with unknown PMHx who presented to [facility] with chief complaint of anemia. Patient is alone from [place]. He is a very poor historian and not able to tell me why he is here or where was he is at. Patient alert to self and time.  States
Hospitalist Progress Note      Patient:  Regina Ramos    Unit/Bed:5K-22/022-A  YOB: 1950  MRN: [id number]   Acct: [de-identified]   PCP: PROVIDER UNKNOWN, LILIANA  Date of Admission: 6/13/2023    Assessment/Plan:  Microcytic anemia, unknown chronicity: Hemoglobin 4.6 on arrival.  Was found on outpatient labs. Denies symptoms at this time. Definite level of iron deficiency anemia. FOBT was negative. Patient does report history of gastric cancer s/p gastric resection, daughter states it was approximately 30 years ago. B12 and folate were normal.  S/p 2 units PRBCs, hemoglobin is 6.9. Third unit to be transfused. GI and hematology consulted. GI started on Protonix drip, IV Venofer. Reticulocytes, peripheral smear, haptoglobin ordered. Monitor H&H every 8 hours. Patient to undergo EGD today. Hematology to see patient. MITZY versus CKD: Creatinine 1.5 on arrival, unknown baseline. UA was negative for infection. Will trial IVF. Avoid nephrotoxic agents, renally dose medications. Will monitor with BMP. Creatinine down to 1.2 this morning. ? Undiagnosed dementia: Patient's family reports intermittent confusion which is chronic. Patient is only alert and oriented to self and time. Appears to be at baseline. History of gastric cancer: S/p gastric resection. Patient's daughter reports was approximately 30 years ago. Treated in [facility]. Does not follow with an oncologist.  Substance use disorder: Patient from [facility], admitted for substance use disorder. Reported history of meth and crack cocaine use. Chief Complaint: anemia     Initial H and P:-    Regina Ramos is a 67 y.o. male with unknown PMHx who presented to [facility] with chief complaint of anemia. Patient is alone from [employer]. He is a very poor historian and not able to tell me why he is here or where was he is at.  Patient
Logan Barone 60  PHYSICAL THERAPY MISSED TREATMENT NOTE  [facility] ONC MED 5K    Date: 6/15/2023  Patient Name: Raul Motley        MRN: [id number]   : 1950  (73 y.o.)  Gender: male                REASON FOR MISSED TREATMENT:  RN reports that pt moves fine and was surprised that therapy was ordered. Went to room and pt was in bed and talking on phone. Pt reports not having any difficulty with mobility and that therapy was not necessary. OT Evaluation note indicated that pt was at Supervision and not keeping pt on caseload for followup. Will defer evaluation. Wilfred Muñoz.  Genesis Weber [place] 8
Unit of PRBC started infusing. Vital signs stable and no concerns from patient at this time.
and development of plan of care and goals. Treatment time included skilled education and facilitation of tasks to increase safety and independence with ADL's for improved functional independence and quality of life. Pt asked about his testing and why he was not able to eat a sandwich. Pt was educated regarding his doctor wanting to rule out bleeding from his GI. Pt is on a clear liquid diet. Pt was provided with a popsicle which was orange flavored. Pt was also informed that the red dye foods were being restricted. Discharge Recommendations:  Continue to assess pending progress    Patient Education:     Patient Education  Education Given To: Patient  Education Provided: Role of Therapy, Plan of Care, Home Exercise Program  Education Provided Comments: Reason for having study of his GI tract to rule out having a bleed  Education Method: Verbal  Barriers to Learning: None  Education Outcome: Verbalized understanding    Equipment Recommendations:  Equipment Needed: No    Plan:  Times Per Week: Not applicable  Additional Comments: Pt would be able to return to facility when medically stable and discharged from Acute. No follow up OT recommended. Specific Instructions for Next Treatment: Not applicable. See long-term goal time frame for expected duration of plan of care. If no long-term goals established, a short length of stay is anticipated. Goals:  Patient goals : \"I want to get stronger. \"  Short Term Goals  Time Frame for Short Term Goals: Not applicable  Short Term Goal 1: Pt will demonstrate BUE exercises while following a handout or demonstration to increase his strength for ease of doing self care and other leisure activities. Met. Short Term Goal 2: Pt will be oriented to place and situation with cues as needed to increase his safety and facilitate progress toward discharge to his home environment.   Met  Additional Goals?: No         Following session, patient left in safe position with all
06/15/23  0500 06/15/23  1416 06/15/23  2109 06/16/23  0525   WBC 4.4* 3.8*  --  5.1  --   --   --    HGB 4.6* 5.4*   < > 7.6* 8.3* 7.1* 7.6*   HCT 19.0* 20.9*   < > 27.1* 29.7* 25.7* 27.1*    179  --  166  --   --   --     < > = values in this interval not displayed. Recent Labs     06/13/23  1545 06/14/23  0336 06/15/23  0500    140 139   K 4.8 4.7 4.5    109 107   CO2 20* 19* 20*   BUN 22 20 13   CREATININE 1.5* 1.3* 1.2   CALCIUM 8.2* 7.7* 7.8*     Recent Labs     06/13/23  1545   AST 16   ALT 14   BILIDIR <0.2   BILITOT 0.3   ALKPHOS 63     Recent Labs     06/13/23  1545   INR 1.02     No results for input(s): Solo Kimble in the last 72 hours. Microbiology:    Blood culture #1: No results found for: BC    Blood culture #2:No results found for: Evertt Chute    Organism:No results found for: ORG    No results found for: LABGRAM    MRSA culture only:No results found for: [facility]    Urine culture: No results found for: LABURIN    Respiratory culture: No results found for: CULTRESP    Aerobic and Anaerobic :  No results found for: LABAERO  No results found for: LABANAE    Urinalysis:      Lab Results   Component Value Date/Time    NITRU NEGATIVE 06/13/2023 07:00 PM    WBCUA 2-4 06/13/2023 07:00 PM    BACTERIA NONE 06/13/2023 07:00 PM    RBCUA NONE 06/13/2023 07:00 PM    BLOODU NEGATIVE 06/13/2023 07:00 PM    GLUCOSEU NEGATIVE 06/13/2023 07:00 PM       Radiology:  No orders to display     No results found.     Electronically signed by CHASE Martinez CNP on 6/16/2023 at 2:37 PM
[place] and had been at [facility] prior to admission for substance abuse. Discussed with patient importance of GI, Hematology/Oncology, PCP follow-up in [place]. Reviewed this with the patient today. He expressed understanding. Other comorbidities:  -History of Gastric Cancer - s/p partial gastrectomy per patient. Denies prior need for blood products or IV infusions.   -History of Substance Abuse        Case discussed with nurse and patient/Hospitalist CHASE Miles-CNP. Questions and concerns addressed.       Electronically signed by   Allegra Welch PA-C on 6/16/2023 at 11:38 AM

## 2023-06-17 NOTE — DISCHARGE INSTRUCTIONS
It is very important to follow-up with your PCP within the next week to get a recheck on your hemoglobin. You also need to have follow-up with a GI provider and likely oncology. Once set up with a provider, they will be able to request your  test results, lab work, and biopsy results from [address] [place] [address].

## 2023-06-17 NOTE — DISCHARGE SUMMARY
Hospitalist Discharge Summary    Patient: Candie Marcum  YOB: 1950  MRN: [id number]   Acct: [de-identified]    Primary Care Physician: PROVIDER UNKNOWN, AGPCNP    Admit date  6/13/2023    Discharge date: 6/17/2023     Discharge Assessment and Plan:    Microcytic anemia, unknown chronicity: Hemoglobin 4.6 on arrival.  Was found on outpatient labs. Denies symptoms. Definite level of iron deficiency anemia. FOBT was negative. Patient does report history of gastric cancer s/p gastric resection, daughter states it was approximately 30 years ago. B12 and folate were normal.  S/p 2 units PRBCs, hemoglobin was 6.9. Third unit was transfused. GI and hematology consulted. GI started on Protonix drip, IV Venofer. Monitor H&H every 8 hours. S/p EGD on 6/15/23 per Dr. Moreno Staff showing thickened gastric folds concerning for recurring growth in the stomach, there may be submucosal invasion. Contact bleeding, friability. Biopsies taken. Prior surgery of the stomach. Erosive esophagitis. IV Protonix transitioned to oral, Carafate continued 4 times a day. GI and hematology signing off as patient will need follow-up closer to home. Hemoglobin stable at 7.3. Discussed with patient and his wife/daughter. Patient will need follow-up with GI, oncology, his PCP. Recommend follow-up appointment this week with PCP and to get his hemoglobin rechecked. Continue iron, Carafate, Protonix on discharge. MITZY versus CKD: Creatinine 1.5 on arrival, unknown baseline. UA was negative for infection. IVF trialed. Avoid nephrotoxic agents, renally dose medications. Creatinine down to 1.2. Follow-up with PCP.  ? Undiagnosed dementia: Patient's family reports intermittent confusion which is chronic. Patient is only alert and oriented to self and time. Appears to be at baseline. History of gastric cancer: S/p gastric resection. Patient's daughter reports was approximately 30 years ago. Treated in [facility].   Will need

## 2023-06-17 NOTE — DISCHARGE INSTR - COC
Elimination:  Continence: Bowel: {YES / SB:99538}  Bladder: {YES / BZ:66263}  Urinary Catheter: {Urinary Catheter:960490596}   Colostomy/Ileostomy/Ileal Conduit: {YES / ZJ:61096}       Date of Last BM: ***    Intake/Output Summary (Last 24 hours) at 2023 1000  Last data filed at 2023 0830  Gross per 24 hour   Intake 900 ml   Output 1450 ml   Net -550 ml     I/O last 3 completed shifts:   In: 56 [P.O.:1300]  Out: 2375 [Urine:2375]    Safety Concerns:     [address] Safety Concerns:129913293}    Impairments/Disabilities:      [address] Impairments/Disabilities:162997281}    Nutrition Therapy:  Current Nutrition Therapy:   { MORGAN Diet List:412889580}    Routes of Feeding: {P DME Other Feedings:505952743}  Liquids: {Slp liquid thickness:51521}  Daily Fluid Restriction: {P DME Yes amt example:765630222}  Last Modified Barium Swallow with Video (Video Swallowing Test): {Done Not Done CLHV:079614558}    Treatments at the Time of Hospital Discharge:   Respiratory Treatments: ***  Oxygen Therapy:  {Therapy; copd oxygen:06130}  Ventilator:    { CC Vent TKHM:229927571}    Rehab Therapies: {THERAPEUTIC INTERVENTION:0582531515}  Weight Bearing Status/Restrictions: 508 just.me  Weight Bearin}  Other Medical Equipment (for information only, NOT a DME order):  {EQUIPMENT:076685601}  Other Treatments: ***    Patient's personal belongings (please select all that are sent with patient):  {P DME Belongings:547697203}    RN SIGNATURE:  {Esignature:443528186}    CASE MANAGEMENT/SOCIAL WORK SECTION    Inpatient Status Date: ***    Readmission Risk Assessment Score:  Readmission Risk              Risk of Unplanned Readmission:  11           Discharging to Facility/ Agency   Name:   Address:  Phone:  Fax:    Dialysis Facility (if applicable)   Name:  Address:  Dialysis Schedule:  Phone:  Fax:    / signature: {Esignature:924613969}    PHYSICIAN SECTION    Prognosis:

## 2023-06-17 NOTE — PLAN OF CARE
Problem: Chronic Conditions and Co-morbidities  Goal: Patient's chronic conditions and co-morbidity symptoms are monitored and maintained or improved  6/14/2023 0454 by Sandeep Veliz RN  Outcome: Progressing  Flowsheets (Taken 6/14/2023 0427)  Care Plan - Patient's Chronic Conditions and Co-Morbidity Symptoms are Monitored and Maintained or Improved: Monitor and assess patient's chronic conditions and comorbid symptoms for stability, deterioration, or improvement  6/13/2023 2253 by Brandie Li RN  Outcome: Progressing     Problem: Metabolic/Fluid and Electrolytes - Adult  Goal: Electrolytes maintained within normal limits  Outcome: Progressing  Flowsheets (Taken 6/14/2023 0427)  Electrolytes maintained within normal limits: Monitor labs and assess patient for signs and symptoms of electrolyte imbalances     Problem: Hematologic - Adult  Goal: Maintains hematologic stability  Outcome: Progressing  Flowsheets (Taken 6/14/2023 0427)  Maintains hematologic stability: Assess for signs and symptoms of bleeding or hemorrhage     Problem: Skin/Tissue Integrity  Goal: Absence of new skin breakdown  Description: 1. Monitor for areas of redness and/or skin breakdown  2. Assess vascular access sites hourly  3. Every 4-6 hours minimum:  Change oxygen saturation probe site  4. Every 4-6 hours:  If on nasal continuous positive airway pressure, respiratory therapy assess nares and determine need for appliance change or resting period.   Outcome: Progressing     Problem: Pain  Goal: Verbalizes/displays adequate comfort level or baseline comfort level  Outcome: Progressing     Problem: Discharge Planning  Goal: Discharge to home or other facility with appropriate resources  Outcome: Progressing  Flowsheets (Taken 6/14/2023 0427)  Discharge to home or other facility with appropriate resources:   Identify barriers to discharge with patient and caregiver   Arrange for needed discharge resources and transportation as
Problem: Chronic Conditions and Co-morbidities  Goal: Patient's chronic conditions and co-morbidity symptoms are monitored and maintained or improved  6/16/2023 1108 by Yessenia Tariq RN  Outcome: Progressing      Patient's chronic conditions and co-morbidity symptoms are monitored and maintained . Problem: Metabolic/Fluid and Electrolytes - Adult  Goal: Electrolytes maintained within normal limits  6/16/2023 1108 by Yessenia Tariq RN  Outcome: Progressing     Electrolytes maintained within normal limits. Problem: Hematologic - Adult  Goal: Maintains hematologic stability  6/16/2023 1108 by Yessenia Tariq RN  Outcome: Progressing  VS-WNL. Patient hematologically stable. Problem: Skin/Tissue Integrity  Goal: Absence of new skin breakdown  Description: 1. Monitor for areas of redness and/or skin breakdown  2. Assess vascular access sites hourly  3. Every 4-6 hours minimum:  Change oxygen saturation probe site  4. Every 4-6 hours:  If on nasal continuous positive airway pressure, respiratory therapy assess nares and determine need for appliance change or resting period. 6/16/2023 1108 by Yessenia Tariq RN  Outcome: Progressing  No skin breakdown this shift. Patient being assisted with turning. Patients states understanding of repositioning every two hours. Problem: Pain  Goal: Verbalizes/displays adequate comfort level or baseline comfort level  6/16/2023 1108 by Yessenia Tariq RN  Outcome: Progressing  Patient states pain relief from PRN pain medications. Pain reassessed one hour post PRN pain medication given. Patient rates pain 0 on CHRISTINA 0-10 scale. Problem: Discharge Planning  Goal: Discharge to home or other facility with appropriate resources  6/16/2023 1108 by Yessenia Tariq RN  Outcome: Progressing  Discharge plan is in process. Plan discharge home with family.      Problem: Safety - Adult  Goal: Free from fall injury  6/16/2023 1108 by Freeman Cheney
Problem: Chronic Conditions and Co-morbidities  Goal: Patient's chronic conditions and co-morbidity symptoms are monitored and maintained or improved  6/17/2023 0939 by Manuel Rowe RN  Outcome: Progressing  Flowsheets (Taken 6/17/2023 0830)  Care Plan - Patient's Chronic Conditions and Co-Morbidity Symptoms are Monitored and Maintained or Improved: Monitor and assess patient's chronic conditions and comorbid symptoms for stability, deterioration, or improvement     Problem: Metabolic/Fluid and Electrolytes - Adult  Goal: Electrolytes maintained within normal limits  6/17/2023 0939 by Manuel Rowe RN  Outcome: Progressing  Flowsheets (Taken 6/17/2023 0830)  Electrolytes maintained within normal limits: Monitor labs and assess patient for signs and symptoms of electrolyte imbalances     Problem: Hematologic - Adult  Goal: Maintains hematologic stability  6/17/2023 0939 by Manuel Rowe RN  Outcome: Progressing  Flowsheets (Taken 6/17/2023 0830)  Maintains hematologic stability: Assess for signs and symptoms of bleeding or hemorrhage     Problem: Skin/Tissue Integrity  Goal: Absence of new skin breakdown  Description: 1. Monitor for areas of redness and/or skin breakdown  2. Assess vascular access sites hourly  3. Every 4-6 hours minimum:  Change oxygen saturation probe site  4. Every 4-6 hours:  If on nasal continuous positive airway pressure, respiratory therapy assess nares and determine need for appliance change or resting period. 6/17/2023 0939 by Manuel Rowe RN  Outcome: Progressing   No skin breakdown this shift. Patient being assisted with turning. Patients states understanding of repositioning every two hours. Problem: Pain  Goal: Verbalizes/displays adequate comfort level or baseline comfort level  6/17/2023 0939 by Manuel Rowe RN  Outcome: Progressing   Pain Assessment: None - Denies Pain  Pain Level: 3   Patient's Stated Pain Goal: 0 - No pain   Is pain goal met at this time?
Problem: Chronic Conditions and Co-morbidities  Goal: Patient's chronic conditions and co-morbidity symptoms are monitored and maintained or improved  Outcome: Progressing
Problem: Chronic Conditions and Co-morbidities  Goal: Patient's chronic conditions and co-morbidity symptoms are monitored and maintained or improved  Outcome: Progressing  Flowsheets (Taken 6/14/2023 1949 by Dory Hatfield RN)  Care Plan - Patient's Chronic Conditions and Co-Morbidity Symptoms are Monitored and Maintained or Improved: Monitor and assess patient's chronic conditions and comorbid symptoms for stability, deterioration, or improvement     Problem: Metabolic/Fluid and Electrolytes - Adult  Goal: Electrolytes maintained within normal limits  Outcome: Progressing  Flowsheets (Taken 6/14/2023 1949 by Dory Hatfield RN)  Electrolytes maintained within normal limits:   Monitor labs and assess patient for signs and symptoms of electrolyte imbalances   Administer electrolyte replacement as ordered     Problem: Hematologic - Adult  Goal: Maintains hematologic stability  Outcome: Progressing  Flowsheets (Taken 6/14/2023 1949 by Dory Hatfield RN)  Maintains hematologic stability: Assess for signs and symptoms of bleeding or hemorrhage     Problem: Skin/Tissue Integrity  Goal: Absence of new skin breakdown  Description: 1. Monitor for areas of redness and/or skin breakdown  2. Assess vascular access sites hourly  3. Every 4-6 hours minimum:  Change oxygen saturation probe site  4. Every 4-6 hours:  If on nasal continuous positive airway pressure, respiratory therapy assess nares and determine need for appliance change or resting period.   Outcome: Progressing  Note: No new evidence of skin breakdown on this shift     Problem: Pain  Goal: Verbalizes/displays adequate comfort level or baseline comfort level  Outcome: Progressing  Flowsheets (Taken 6/15/2023 0800 by Rocky Correia RN)  Verbalizes/displays adequate comfort level or baseline comfort level:   Encourage patient to monitor pain and request assistance   Assess pain using appropriate pain scale   Administer analgesics based on type and severity of
Problem: Chronic Conditions and Co-morbidities  Goal: Patient's chronic conditions and co-morbidity symptoms are monitored and maintained or improved  Outcome: Progressing  Flowsheets (Taken 6/14/2023 1949)  Care Plan - Patient's Chronic Conditions and Co-Morbidity Symptoms are Monitored and Maintained or Improved: Monitor and assess patient's chronic conditions and comorbid symptoms for stability, deterioration, or improvement     Problem: Metabolic/Fluid and Electrolytes - Adult  Goal: Electrolytes maintained within normal limits  Outcome: Progressing  Flowsheets (Taken 6/14/2023 1949)  Electrolytes maintained within normal limits:   Monitor labs and assess patient for signs and symptoms of electrolyte imbalances   Administer electrolyte replacement as ordered     Problem: Hematologic - Adult  Goal: Maintains hematologic stability  Outcome: Progressing  Flowsheets (Taken 6/14/2023 1949)  Maintains hematologic stability: Assess for signs and symptoms of bleeding or hemorrhage     Problem: Skin/Tissue Integrity  Goal: Absence of new skin breakdown  Description: 1. Monitor for areas of redness and/or skin breakdown  2. Assess vascular access sites hourly  3. Every 4-6 hours minimum:  Change oxygen saturation probe site  4. Every 4-6 hours:  If on nasal continuous positive airway pressure, respiratory therapy assess nares and determine need for appliance change or resting period.   Outcome: Progressing     Problem: Pain  Goal: Verbalizes/displays adequate comfort level or baseline comfort level  Outcome: Progressing  Flowsheets (Taken 6/14/2023 1949)  Verbalizes/displays adequate comfort level or baseline comfort level:   Encourage patient to monitor pain and request assistance   Assess pain using appropriate pain scale   Administer analgesics based on type and severity of pain and evaluate response   Implement non-pharmacological measures as appropriate and evaluate response     Problem: Discharge
Results and current treatment plan discussed with patient's daughter and wife. Discussed need for follow-up with PCP, GI, oncology. Patient's wife and daughter report understanding of this. Discussed importance of follow-up with PCP by at least the middle to end of next week for repeat labs and to ensure that patient has not become more apparently has not had a appointment with PCP in many years. However daughter states that she will get this set up for him. Patient's daughter also reports that she is a breast cancer survivor and has a oncologist which she can get her father set up with.
positive airway pressure, respiratory therapy assess nares and determine need for appliance change or resting period. 6/17/2023 0330 by Addison Ingram RN  Outcome: Progressing  Note: No new evidence of skin breakdown on this shift     Problem: Pain  Goal: Verbalizes/displays adequate comfort level or baseline comfort level  6/17/2023 0330 by Addison Ingram RN  Outcome: Progressing  Flowsheets (Taken 6/15/2023 0800 by Briseida Hameed RN)  Verbalizes/displays adequate comfort level or baseline comfort level:   Encourage patient to monitor pain and request assistance   Assess pain using appropriate pain scale   Administer analgesics based on type and severity of pain and evaluate response     Problem: Discharge Planning  Goal: Discharge to home or other facility with appropriate resources  6/17/2023 0330 by Addison Ingram RN  Outcome: Progressing  Flowsheets (Taken 6/14/2023 1949 by Kristian Bar RN)  Discharge to home or other facility with appropriate resources:   Identify barriers to discharge with patient and caregiver   Arrange for needed discharge resources and transportation as appropriate   Identify discharge learning needs (meds, wound care, etc)     Problem: Safety - Adult  Goal: Free from fall injury  6/17/2023 0330 by Addison Ingram RN  Outcome: Progressing  Flowsheets (Taken 6/14/2023 1949 by Kristian Bar RN)  Free From Fall Injury: Instruct family/caregiver on patient safety    Care plan reviewed with patient. Patient  verbalize understanding of the plan of care and contribute to goal setting.

## 2023-06-20 ENCOUNTER — TELEPHONE (OUTPATIENT)
Dept: ONCOLOGY | Age: 73
End: 2023-06-20

## 2023-06-20 NOTE — TELEPHONE ENCOUNTER
Received patient's biopsy result from EGD completed per Dr. Dipti Mcdonough on 6/15/2023    FINAL DIAGNOSIS:   Stomach, mass, biopsy:     Chronic gastritis with focal activity and erosion. Helicobacter microorganisms are not identified. Intestinal metaplasia. Called and discussed with the patient. Discussed importance of PCP, Hematology and GI follow up for him locally. He is back in [place], [place] at home. He expressed understanding.